# Patient Record
Sex: FEMALE | Race: WHITE | NOT HISPANIC OR LATINO | URBAN - METROPOLITAN AREA
[De-identification: names, ages, dates, MRNs, and addresses within clinical notes are randomized per-mention and may not be internally consistent; named-entity substitution may affect disease eponyms.]

---

## 2020-09-02 ENCOUNTER — EMERGENCY (EMERGENCY)
Facility: HOSPITAL | Age: 45
LOS: 1 days | Discharge: HOME | End: 2020-09-02
Admitting: EMERGENCY MEDICINE
Payer: COMMERCIAL

## 2020-09-02 VITALS
SYSTOLIC BLOOD PRESSURE: 108 MMHG | HEIGHT: 67 IN | DIASTOLIC BLOOD PRESSURE: 52 MMHG | WEIGHT: 145.06 LBS | OXYGEN SATURATION: 97 % | RESPIRATION RATE: 16 BRPM | TEMPERATURE: 100 F | HEART RATE: 70 BPM

## 2020-09-02 PROCEDURE — 99283 EMERGENCY DEPT VISIT LOW MDM: CPT

## 2020-09-02 NOTE — ED PROVIDER NOTE - NSFOLLOWUPINSTRUCTIONS_ED_ALL_ED_FT
Your covid-19 results are pending, you will receive them electronically.     Return to the Emergency Department for any concerns.

## 2020-09-02 NOTE — ED PROVIDER NOTE - PATIENT PORTAL LINK FT
You can access the FollowMyHealth Patient Portal offered by [facility] by registering at the following website: [email or web address]. By joining [employer]’s FollowMyHealth portal, you will also be able to view your health information using other applications (apps) compatible with our system.

## 2020-09-02 NOTE — ED PROVIDER NOTE - OBJECTIVE STATEMENT
46 yo F w/ no pertinent PMHx presents to the ED requesting COVID-19 testing. Pt has no medical complaints currently. Denies fever, chills, cough, SOB, chest pain, abdominal pain, N/V/D, throat pain. Pt denies contact with a person who has known COVID-19. 44 yo F w/ no pertinent PMHx presents to the ED requesting COVID-19 testing. Pt has no medical complaints currently. Denies fever, chills, cough, SOB, chest pain, abdominal pain, N/V/D, throat pain. Pt reports contact with a person who had URI symptoms recently.

## 2020-09-02 NOTE — ED PROVIDER NOTE - CLINICAL SUMMARY MEDICAL DECISION MAKING FREE TEXT BOX
Pt presents to the ED for COVID-19 testing. Pt denies medical complaints or symptoms. No vital sign derangements. Will swab for COVID-19 and discharge. Pt agrees to receiving results electronically. Pt presents to the ED for COVID-19 testing. Pt denies medical complaints or symptoms. No vital sign derangements. well appearing, NAD. Will swab for COVID-19 and discharge. Pt agrees to receiving results electronically.

## 2020-09-02 NOTE — ED ADULT NURSE NOTE - OBJECTIVE STATEMENT
Pt aox3. Here for Covid-19 testing.  Pt rpts contact with someone with flu-like sx.  Pt denies any sx.

## 2020-09-06 DIAGNOSIS — Z20.828 CONTACT WITH AND (SUSPECTED) EXPOSURE TO OTHER VIRAL COMMUNICABLE DISEASES: ICD-10-CM

## 2020-10-10 ENCOUNTER — EMERGENCY (EMERGENCY)
Facility: HOSPITAL | Age: 45
LOS: 1 days | Discharge: HOME | End: 2020-10-10
Admitting: EMERGENCY MEDICINE
Payer: COMMERCIAL

## 2020-10-10 VITALS
DIASTOLIC BLOOD PRESSURE: 71 MMHG | TEMPERATURE: 100 F | HEART RATE: 71 BPM | RESPIRATION RATE: 18 BRPM | OXYGEN SATURATION: 98 % | HEIGHT: 67 IN | SYSTOLIC BLOOD PRESSURE: 117 MMHG | WEIGHT: 139.99 LBS

## 2020-10-10 DIAGNOSIS — Z20.828 CONTACT WITH AND (SUSPECTED) EXPOSURE TO OTHER VIRAL COMMUNICABLE DISEASES: ICD-10-CM

## 2020-10-10 DIAGNOSIS — F41.9 ANXIETY DISORDER, UNSPECIFIED: ICD-10-CM

## 2020-10-10 PROCEDURE — 99283 EMERGENCY DEPT VISIT LOW MDM: CPT

## 2020-10-10 RX ORDER — ESCITALOPRAM OXALATE 10 MG/1
1 TABLET, FILM COATED ORAL
Qty: 30 | Refills: 0
Start: 2020-10-10 | End: 2020-11-08

## 2020-10-10 NOTE — ED PROVIDER NOTE - OBJECTIVE STATEMENT
46 y/o female presenting to the ED requesting COVID-19 screening. Patient is currently asymptomatic and has no other medical complaints at this time. Denies recent travel or known exposure to COVID-19. Denies fever, chills, chest pain, SOB. 46 y/o female presenting to the ED requesting COVID-19 screening. Patient is currently asymptomatic and has no other medical complaints at this time -- works as a . Denies recent travel or known exposure to COVID-19. Denies fever, chills, chest pain, SOB. also requesting med refill for lexapro as she was not able to get to [facility] today as she states its closed and will be in the process of getting new followup.

## 2020-10-10 NOTE — ED PROVIDER NOTE - PATIENT PORTAL LINK FT
You can access the FollowMyHealth Patient Portal offered by [facility] by registering at the following website: [email or web address]. By joining [employer]’s FollowMyHealth portal, you will also be able to view your health information using other applications (apps) compatible with our system.

## 2020-10-10 NOTE — ED PROVIDER NOTE - CLINICAL SUMMARY MEDICAL DECISION MAKING FREE TEXT BOX
Patient requesting testing for COVID-19. On exam, Patient appears well and in no apparent distress. Currently asymptomatic. Will order testing for COVID-19 and discharge afterwards. Patient agrees to receiving results electronically. Patient requesting testing for COVID-19. On exam, Patient appears well and in no apparent distress. Currently asymptomatic. Will order testing for COVID-19 and discharge afterwards. Patient agrees to receiving results electronically. also requesting med refill for lexapro, will rx meds, no acute psych issues, will dc.

## 2020-10-10 NOTE — ED PROVIDER NOTE - PHYSICAL EXAMINATION
Gen - WDWN, NAD, comfortable and non-toxic appearing  Skin - warm, dry, intact   HEENT - Airway patent, neck supple. Uvula midline. No oropharyngeal swelling/edema.  CV - S1S2, R/R/R  Resp - Breathing comfortably on RA. Lungs CTA B/L.  GI - Soft, NT/ND  MS - Neck supple, no extremity swelling/edema  Neuro - AxOx3, clear speech, grossly unremarkable.  Psych - Calm and cooperative. Normal mood and affect. Gen - WDWN, NAD, comfortable and non-toxic appearing  Skin - warm, dry, intact   HEENT - Airway patent  Resp - Breathing comfortably on RA.   Psych - Calm and cooperative. Normal mood and affect. denies SI/HI.

## 2020-10-11 LAB — SARS-COV-2 RNA SPEC QL NAA+PROBE: SIGNIFICANT CHANGE UP

## 2020-11-13 ENCOUNTER — EMERGENCY (EMERGENCY)
Facility: HOSPITAL | Age: 45
LOS: 1 days | Discharge: HOME | End: 2020-11-13
Admitting: STUDENT IN AN ORGANIZED HEALTH CARE EDUCATION/TRAINING PROGRAM
Payer: COMMERCIAL

## 2020-11-13 VITALS
SYSTOLIC BLOOD PRESSURE: 124 MMHG | DIASTOLIC BLOOD PRESSURE: 78 MMHG | HEART RATE: 80 BPM | RESPIRATION RATE: 16 BRPM | HEIGHT: 67 IN | TEMPERATURE: 98 F | OXYGEN SATURATION: 99 %

## 2020-11-13 DIAGNOSIS — Z20.828 CONTACT WITH AND (SUSPECTED) EXPOSURE TO OTHER VIRAL COMMUNICABLE DISEASES: ICD-10-CM

## 2020-11-13 PROCEDURE — 99283 EMERGENCY DEPT VISIT LOW MDM: CPT

## 2020-11-13 NOTE — ED PROVIDER NOTE - NSFOLLOWUPINSTRUCTIONS_ED_ALL_ED_FT
THE COVID 19 TEST RESULTS  - results may take up to 2-3 days to become available   - if you have consented, you will receive your results electronically   -  you can check [facility] or call [phone] to discuss your results with our nursing staff    Please continue to self quarantine (home isolation) until your result is back and follow instructions accordingly  - positive: complete home isolation for a total of 14 days since day of testing and no more fever with feeling back to baseline   - negative: you will be able to stop home isolation but still practice standard precautions, similar to how you would manage a regular flu/cold.    Return to ER for any worsening symptoms, such as persistent fever >100.4F, shortness of breath, coughing up bloody sputum, chest pain, lethargy, and fainting    Please remember to wash your hands frequently (>20 seconds each time), avoid touching your face, and cover your cough/sneeze.  Always wear a mask when you are outside of your home and practice social distancing.    Only take Tylenol for fever/pain control and avoid NSAIDs (ibuprofen/Advil/Aleve/naproxen) due to potential increased risk of exacerbating COVID-19 infection

## 2020-11-13 NOTE — ED PROVIDER NOTE - OBJECTIVE STATEMENT
44 y/o F presents to ED 46 y/o F presents to ED requesting COVID19 testing.  Pt is asymptomatic and denies recent contact with confirmed or suspected person of COVID19 intact.  Pt denies fevers/chills, cough, chest pain, abd pain, n/v/d, known sick contacts or recent travels.

## 2020-11-13 NOTE — ED PROVIDER NOTE - PATIENT PORTAL LINK FT
You can access the FollowMyHealth Patient Portal offered by [facility] by registering at the following website: [email or web address]. By joining [employer]’s FollowMyHealth portal, you will also be able to view your health information using other applications (apps) compatible with our system.

## 2020-11-14 LAB — SARS-COV-2 RNA SPEC QL NAA+PROBE: SIGNIFICANT CHANGE UP

## 2020-11-22 ENCOUNTER — EMERGENCY (EMERGENCY)
Facility: HOSPITAL | Age: 45
LOS: 1 days | Discharge: HOME | End: 2020-11-22
Admitting: EMERGENCY MEDICINE
Payer: COMMERCIAL

## 2020-11-22 VITALS
TEMPERATURE: 98 F | SYSTOLIC BLOOD PRESSURE: 130 MMHG | DIASTOLIC BLOOD PRESSURE: 80 MMHG | HEIGHT: 67 IN | HEART RATE: 65 BPM | RESPIRATION RATE: 18 BRPM | OXYGEN SATURATION: 98 %

## 2020-11-22 DIAGNOSIS — Z20.828 CONTACT WITH AND (SUSPECTED) EXPOSURE TO OTHER VIRAL COMMUNICABLE DISEASES: ICD-10-CM

## 2020-11-22 PROCEDURE — 99283 EMERGENCY DEPT VISIT LOW MDM: CPT

## 2020-11-22 NOTE — ED PROVIDER NOTE - PATIENT PORTAL LINK FT
You can access the FollowMyHealth Patient Portal offered by [facility] by registering at the following website: [email or web address]. By joining [employer]’s FollowMyHealth portal, you will also be able to view your health information using other applications (apps) compatible with our system.

## 2020-12-08 ENCOUNTER — EMERGENCY (EMERGENCY)
Facility: HOSPITAL | Age: 45
LOS: 1 days | Discharge: HOME | End: 2020-12-08
Admitting: EMERGENCY MEDICINE
Payer: COMMERCIAL

## 2020-12-08 VITALS
SYSTOLIC BLOOD PRESSURE: 125 MMHG | TEMPERATURE: 99 F | OXYGEN SATURATION: 98 % | WEIGHT: 134.92 LBS | DIASTOLIC BLOOD PRESSURE: 68 MMHG | HEIGHT: 67 IN | RESPIRATION RATE: 18 BRPM | HEART RATE: 72 BPM

## 2020-12-08 DIAGNOSIS — Z20.828 CONTACT WITH AND (SUSPECTED) EXPOSURE TO OTHER VIRAL COMMUNICABLE DISEASES: ICD-10-CM

## 2020-12-08 PROCEDURE — 99283 EMERGENCY DEPT VISIT LOW MDM: CPT

## 2020-12-08 NOTE — ED PROVIDER NOTE - PATIENT PORTAL LINK FT
You can access the FollowMyHealth Patient Portal offered by [facility] by registering at the following website: [email or web address]. By joining [employer]’s FollowMyHealth portal, you will also be able to view your health information using other applications (apps) compatible with our system.

## 2021-04-27 ENCOUNTER — EMERGENCY (EMERGENCY)
Facility: HOSPITAL | Age: 46
LOS: 1 days | Discharge: HOME | End: 2021-04-27
Admitting: EMERGENCY MEDICINE
Payer: COMMERCIAL

## 2021-04-27 VITALS
OXYGEN SATURATION: 98 % | SYSTOLIC BLOOD PRESSURE: 121 MMHG | HEIGHT: 67 IN | HEART RATE: 64 BPM | DIASTOLIC BLOOD PRESSURE: 63 MMHG | WEIGHT: 156.97 LBS | RESPIRATION RATE: 18 BRPM | TEMPERATURE: 98 F

## 2021-04-27 VITALS
SYSTOLIC BLOOD PRESSURE: 116 MMHG | RESPIRATION RATE: 18 BRPM | HEART RATE: 58 BPM | OXYGEN SATURATION: 99 % | TEMPERATURE: 98 F | DIASTOLIC BLOOD PRESSURE: 76 MMHG

## 2021-04-27 DIAGNOSIS — R42 DIZZINESS AND GIDDINESS: ICD-10-CM

## 2021-04-27 DIAGNOSIS — R00.1 BRADYCARDIA, UNSPECIFIED: ICD-10-CM

## 2021-04-27 DIAGNOSIS — Z79.899 OTHER LONG TERM (CURRENT) DRUG THERAPY: ICD-10-CM

## 2021-04-27 DIAGNOSIS — Z79.2 LONG TERM (CURRENT) USE OF ANTIBIOTICS: ICD-10-CM

## 2021-04-27 DIAGNOSIS — Z20.822 CONTACT WITH AND (SUSPECTED) EXPOSURE TO COVID-19: ICD-10-CM

## 2021-04-27 LAB
ALBUMIN SERPL ELPH-MCNC: 4 G/DL — SIGNIFICANT CHANGE UP (ref 3.4–5)
ALP SERPL-CCNC: 46 U/L — SIGNIFICANT CHANGE UP (ref 40–120)
ALT FLD-CCNC: 24 U/L — SIGNIFICANT CHANGE UP (ref 12–42)
AMPHET UR-MCNC: NEGATIVE — SIGNIFICANT CHANGE UP
ANION GAP SERPL CALC-SCNC: 9 MMOL/L — SIGNIFICANT CHANGE UP (ref 9–16)
APPEARANCE UR: CLEAR — SIGNIFICANT CHANGE UP
APTT BLD: 30.1 SEC — SIGNIFICANT CHANGE UP (ref 27.5–35.5)
AST SERPL-CCNC: 27 U/L — SIGNIFICANT CHANGE UP (ref 15–37)
BACTERIA # UR AUTO: PRESENT /HPF
BARBITURATES UR SCN-MCNC: NEGATIVE — SIGNIFICANT CHANGE UP
BASOPHILS # BLD AUTO: 0.04 K/UL — SIGNIFICANT CHANGE UP (ref 0–0.2)
BASOPHILS NFR BLD AUTO: 0.7 % — SIGNIFICANT CHANGE UP (ref 0–2)
BENZODIAZ UR-MCNC: NEGATIVE — SIGNIFICANT CHANGE UP
BILIRUB SERPL-MCNC: 0.4 MG/DL — SIGNIFICANT CHANGE UP (ref 0.2–1.2)
BILIRUB UR-MCNC: NEGATIVE — SIGNIFICANT CHANGE UP
BUN SERPL-MCNC: 10 MG/DL — SIGNIFICANT CHANGE UP (ref 7–23)
CALCIUM SERPL-MCNC: 9.6 MG/DL — SIGNIFICANT CHANGE UP (ref 8.5–10.5)
CHLORIDE SERPL-SCNC: 104 MMOL/L — SIGNIFICANT CHANGE UP (ref 96–108)
CO2 SERPL-SCNC: 28 MMOL/L — SIGNIFICANT CHANGE UP (ref 22–31)
COCAINE METAB.OTHER UR-MCNC: NEGATIVE — SIGNIFICANT CHANGE UP
COLOR SPEC: YELLOW — SIGNIFICANT CHANGE UP
CREAT SERPL-MCNC: 0.63 MG/DL — SIGNIFICANT CHANGE UP (ref 0.5–1.3)
DIFF PNL FLD: ABNORMAL
EOSINOPHIL # BLD AUTO: 0.03 K/UL — SIGNIFICANT CHANGE UP (ref 0–0.5)
EOSINOPHIL NFR BLD AUTO: 0.5 % — SIGNIFICANT CHANGE UP (ref 0–6)
EPI CELLS # UR: SIGNIFICANT CHANGE UP /HPF (ref 0–5)
ETHANOL SERPL-MCNC: <3 MG/DL — SIGNIFICANT CHANGE UP
GLUCOSE SERPL-MCNC: 99 MG/DL — SIGNIFICANT CHANGE UP (ref 70–99)
GLUCOSE UR QL: NEGATIVE — SIGNIFICANT CHANGE UP
HCT VFR BLD CALC: 38.9 % — SIGNIFICANT CHANGE UP (ref 34.5–45)
HGB BLD-MCNC: 13.2 G/DL — SIGNIFICANT CHANGE UP (ref 11.5–15.5)
IMM GRANULOCYTES NFR BLD AUTO: 0.3 % — SIGNIFICANT CHANGE UP (ref 0–1.5)
INR BLD: 1.12 — SIGNIFICANT CHANGE UP (ref 0.88–1.16)
KETONES UR-MCNC: NEGATIVE — SIGNIFICANT CHANGE UP
LACTATE SERPL-SCNC: 0.5 MMOL/L — SIGNIFICANT CHANGE UP (ref 0.4–2)
LEUKOCYTE ESTERASE UR-ACNC: ABNORMAL
LIDOCAIN IGE QN: 154 U/L — SIGNIFICANT CHANGE UP (ref 73–393)
LYMPHOCYTES # BLD AUTO: 1.83 K/UL — SIGNIFICANT CHANGE UP (ref 1–3.3)
LYMPHOCYTES # BLD AUTO: 30.1 % — SIGNIFICANT CHANGE UP (ref 13–44)
MAGNESIUM SERPL-MCNC: 2 MG/DL — SIGNIFICANT CHANGE UP (ref 1.6–2.6)
MCHC RBC-ENTMCNC: 30.1 PG — SIGNIFICANT CHANGE UP (ref 27–34)
MCHC RBC-ENTMCNC: 33.9 GM/DL — SIGNIFICANT CHANGE UP (ref 32–36)
MCV RBC AUTO: 88.8 FL — SIGNIFICANT CHANGE UP (ref 80–100)
METHADONE UR-MCNC: NEGATIVE — SIGNIFICANT CHANGE UP
MONOCYTES # BLD AUTO: 0.32 K/UL — SIGNIFICANT CHANGE UP (ref 0–0.9)
MONOCYTES NFR BLD AUTO: 5.3 % — SIGNIFICANT CHANGE UP (ref 2–14)
NEUTROPHILS # BLD AUTO: 3.83 K/UL — SIGNIFICANT CHANGE UP (ref 1.8–7.4)
NEUTROPHILS NFR BLD AUTO: 63.1 % — SIGNIFICANT CHANGE UP (ref 43–77)
NITRITE UR-MCNC: NEGATIVE — SIGNIFICANT CHANGE UP
NRBC # BLD: 0 /100 WBCS — SIGNIFICANT CHANGE UP (ref 0–0)
NT-PROBNP SERPL-SCNC: 131 PG/ML — SIGNIFICANT CHANGE UP
OPIATES UR-MCNC: NEGATIVE — SIGNIFICANT CHANGE UP
PCP SPEC-MCNC: SIGNIFICANT CHANGE UP
PCP UR-MCNC: NEGATIVE — SIGNIFICANT CHANGE UP
PH UR: 7 — SIGNIFICANT CHANGE UP (ref 5–8)
PLATELET # BLD AUTO: 286 K/UL — SIGNIFICANT CHANGE UP (ref 150–400)
POTASSIUM SERPL-MCNC: 4.1 MMOL/L — SIGNIFICANT CHANGE UP (ref 3.5–5.3)
POTASSIUM SERPL-SCNC: 4.1 MMOL/L — SIGNIFICANT CHANGE UP (ref 3.5–5.3)
PROT SERPL-MCNC: 7.4 G/DL — SIGNIFICANT CHANGE UP (ref 6.4–8.2)
PROT UR-MCNC: NEGATIVE MG/DL — SIGNIFICANT CHANGE UP
PROTHROM AB SERPL-ACNC: 13.2 SEC — SIGNIFICANT CHANGE UP (ref 10.6–13.6)
RBC # BLD: 4.38 M/UL — SIGNIFICANT CHANGE UP (ref 3.8–5.2)
RBC # FLD: 12.1 % — SIGNIFICANT CHANGE UP (ref 10.3–14.5)
RBC CASTS # UR COMP ASSIST: ABNORMAL /HPF
SARS-COV-2 RNA SPEC QL NAA+PROBE: SIGNIFICANT CHANGE UP
SODIUM SERPL-SCNC: 141 MMOL/L — SIGNIFICANT CHANGE UP (ref 132–145)
SP GR SPEC: 1.01 — SIGNIFICANT CHANGE UP (ref 1–1.03)
THC UR QL: NEGATIVE — SIGNIFICANT CHANGE UP
TROPONIN I SERPL-MCNC: <0.017 NG/ML — LOW (ref 0.02–0.06)
UROBILINOGEN FLD QL: 0.2 E.U./DL — SIGNIFICANT CHANGE UP
WBC # BLD: 6.07 K/UL — SIGNIFICANT CHANGE UP (ref 3.8–10.5)
WBC # FLD AUTO: 6.07 K/UL — SIGNIFICANT CHANGE UP (ref 3.8–10.5)
WBC UR QL: ABNORMAL /HPF

## 2021-04-27 PROCEDURE — 99285 EMERGENCY DEPT VISIT HI MDM: CPT

## 2021-04-27 PROCEDURE — 70450 CT HEAD/BRAIN W/O DYE: CPT | Mod: 26

## 2021-04-27 PROCEDURE — 93010 ELECTROCARDIOGRAM REPORT: CPT | Mod: NC

## 2021-04-27 RX ORDER — ACETAMINOPHEN 500 MG
650 TABLET ORAL ONCE
Refills: 0 | Status: COMPLETED | OUTPATIENT
Start: 2021-04-27 | End: 2021-04-27

## 2021-04-27 RX ORDER — IBUPROFEN 200 MG
600 TABLET ORAL ONCE
Refills: 0 | Status: COMPLETED | OUTPATIENT
Start: 2021-04-27 | End: 2021-04-27

## 2021-04-27 RX ORDER — METOCLOPRAMIDE HCL 10 MG
10 TABLET ORAL ONCE
Refills: 0 | Status: COMPLETED | OUTPATIENT
Start: 2021-04-27 | End: 2021-04-27

## 2021-04-27 RX ORDER — SODIUM CHLORIDE 9 MG/ML
1000 INJECTION INTRAMUSCULAR; INTRAVENOUS; SUBCUTANEOUS ONCE
Refills: 0 | Status: COMPLETED | OUTPATIENT
Start: 2021-04-27 | End: 2021-04-27

## 2021-04-27 RX ORDER — MECLIZINE HCL 12.5 MG
25 TABLET ORAL ONCE
Refills: 0 | Status: COMPLETED | OUTPATIENT
Start: 2021-04-27 | End: 2021-04-27

## 2021-04-27 RX ORDER — DIPHENHYDRAMINE HCL 50 MG
25 CAPSULE ORAL ONCE
Refills: 0 | Status: COMPLETED | OUTPATIENT
Start: 2021-04-27 | End: 2021-04-27

## 2021-04-27 RX ADMIN — Medication 1 TABLET(S): at 12:23

## 2021-04-27 RX ADMIN — Medication 600 MILLIGRAM(S): at 12:23

## 2021-04-27 RX ADMIN — Medication 650 MILLIGRAM(S): at 12:22

## 2021-04-27 RX ADMIN — SODIUM CHLORIDE 1000 MILLILITER(S): 9 INJECTION INTRAMUSCULAR; INTRAVENOUS; SUBCUTANEOUS at 12:22

## 2021-04-27 RX ADMIN — Medication 25 MILLIGRAM(S): at 12:23

## 2021-04-27 RX ADMIN — Medication 25 MILLIGRAM(S): at 12:24

## 2021-04-27 RX ADMIN — Medication 10 MILLIGRAM(S): at 12:24

## 2021-04-27 NOTE — ED ADULT TRIAGE NOTE - CHIEF COMPLAINT QUOTE
Patient to ED with complaint of vertigo last night with episodes of vomiting.  Patient states this is second time in 1 week.  Patient denies CP and SOB

## 2021-04-27 NOTE — ED PROVIDER NOTE - OBJECTIVE STATEMENT
45 y/o female who is a recovering addict presents to the ED with complaints of intermittent positional dizziness described as a room-spinning sensation. Patient states she had a similar episode before, and she called her a Teledoc who had her do a maneuver, and her symptoms went away. Patient feels more discomfort when she moves. Endorses some nausea. Denies fever, chills, chest pain, SOB, abdominal pain, N/V, syncope, or weakness. Walks without assistance. 47 y/o female who is a recovering addict presents to the ED with complaints of intermittent positional dizziness described as a room-spinning sensation. Patient states she had a similar episode before, and she called a Teledoc who had her do a maneuver, and her symptoms went away. Patient feels more discomfort when she moves. Endorses some nausea. Denies fever, chills, chest pain, SOB, abdominal pain, vomiting, syncope, or weakness. Walks without assistance.

## 2021-04-27 NOTE — ED PROVIDER NOTE - CLINICAL SUMMARY MEDICAL DECISION MAKING FREE TEXT BOX
Patient with room-spinning dizziness. Will order CT head and do vertigo workup. Will provide symptomatic relief.

## 2021-04-27 NOTE — ED PROVIDER NOTE - PATIENT PORTAL LINK FT
You can access the FollowMyHealth Patient Portal offered by [facility] by registering at the following website: [email or web address]. By joining [employer]’s FollowMyHealth portal, you will also be able to view your health information using other applications (apps) compatible with our system.

## 2021-04-29 ENCOUNTER — NON-APPOINTMENT (OUTPATIENT)
Age: 46
End: 2021-04-29

## 2021-04-29 PROBLEM — Z00.00 ENCOUNTER FOR PREVENTIVE HEALTH EXAMINATION: Status: ACTIVE | Noted: 2021-04-29

## 2021-04-30 ENCOUNTER — APPOINTMENT (OUTPATIENT)
Dept: OTOLARYNGOLOGY | Facility: CLINIC | Age: 46
End: 2021-04-30
Payer: COMMERCIAL

## 2021-04-30 VITALS — WEIGHT: 157 LBS | TEMPERATURE: 97.5 F | HEIGHT: 67 IN | BODY MASS INDEX: 24.64 KG/M2

## 2021-04-30 DIAGNOSIS — Z82.49 FAMILY HISTORY OF ISCHEMIC HEART DISEASE AND OTHER DISEASES OF THE CIRCULATORY SYSTEM: ICD-10-CM

## 2021-04-30 DIAGNOSIS — H93.293 OTHER ABNORMAL AUDITORY PERCEPTIONS, BILATERAL: ICD-10-CM

## 2021-04-30 DIAGNOSIS — Z86.39 PERSONAL HISTORY OF OTHER ENDOCRINE, NUTRITIONAL AND METABOLIC DISEASE: ICD-10-CM

## 2021-04-30 DIAGNOSIS — Z87.891 PERSONAL HISTORY OF NICOTINE DEPENDENCE: ICD-10-CM

## 2021-04-30 DIAGNOSIS — N95.9 UNSPECIFIED MENOPAUSAL AND PERIMENOPAUSAL DISORDER: ICD-10-CM

## 2021-04-30 DIAGNOSIS — Z83.3 FAMILY HISTORY OF DIABETES MELLITUS: ICD-10-CM

## 2021-04-30 DIAGNOSIS — Z86.59 PERSONAL HISTORY OF OTHER MENTAL AND BEHAVIORAL DISORDERS: ICD-10-CM

## 2021-04-30 PROCEDURE — 99203 OFFICE O/P NEW LOW 30 MIN: CPT

## 2021-04-30 PROCEDURE — 99072 ADDL SUPL MATRL&STAF TM PHE: CPT

## 2021-04-30 PROCEDURE — 92567 TYMPANOMETRY: CPT

## 2021-04-30 PROCEDURE — 92557 COMPREHENSIVE HEARING TEST: CPT

## 2021-04-30 NOTE — CONSULT LETTER
[Dear  ___] : Dear  [unfilled], [Consult Letter:] : I had the pleasure of evaluating your patient, [unfilled]. [Please see my note below.] : Please see my note below. [Consult Closing:] : Thank you very much for allowing me to participate in the care of this patient.  If you have any questions, please do not hesitate to contact me. [Sincerely,] : Sincerely, [FreeTextEntry3] : Lilliam Schmid MD\par

## 2021-04-30 NOTE — ASSESSMENT
[FreeTextEntry1] : She has had episodes of dizziness and vertigo consistent with benign positional vertigo. Her ear exam and audiogram were normal. there was no obvious ear infection on exam today.\par \par PLAN\par \par -findings and management options discussed in detail with the patient. \par -good aural hygiene\par -avoid using cotton swabs in the ears\par -noise precautions\par -annual audiogram\par -meclizine prn severe dizziness\par -she may stop the antibiotics as the ear looked normal\par - I am sending her for vestibular therapy. She held off on Lia-Hallpike testing today as she gets severely nauseated with the maneuver.\par -follow up after the vestibular therapy evaluation. I asked to call me and let me know if the dizziness resolved. If it does, I will see her back as needed. If it does not, we will discuss further workup such as imaging studies, specialized inner ear testing, and neurology evaluation\par -call and return earlier if any concerns. \par

## 2021-04-30 NOTE — HISTORY OF PRESENT ILLNESS
[de-identified] : SHU NORMAN is a 46 year patient Here for evaluation for dizziness and vertigo. She had an episode of vertigo about 6 weeks ago. About 5 days after she got her second COVID vaccine, she developed vertigo when she turned over in bed to the right at night. It lasted several seconds or minutes. She did have nausea. She had no other ear symptoms. She felt a bit off the next day. She spoke with her PCP and did the Epley maneuver which helped. She did well until this past Monday night. Again she turned the right and had severe vertigo with nausea. She had no otalgia, otorrhea, or tinnitus. She did not notice a change in her hearing. She had no muscle weakness or visual changes. She went to the emergency room. She said a CT scan and EKG were okay. She was placed on antibiotics for an ear infection. She had recurrent ear infections as a child. She has no history of prior otologic surgery. She has no history of ear or head trauma. She did have an ear plug removed from the ear in 2011. She does have a history of noise exposure in the past. 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 She has not had a recent audiogram. She does have a history of migraines in the past but has not had one for several years. She does have TMJ dysfunction. She is undergoing Invisalign treatment. She is also perimenopausal. She has been taking meclizine as needed

## 2021-05-12 ENCOUNTER — NON-APPOINTMENT (OUTPATIENT)
Age: 46
End: 2021-05-12

## 2021-05-20 ENCOUNTER — NON-APPOINTMENT (OUTPATIENT)
Age: 46
End: 2021-05-20

## 2022-02-14 NOTE — ED ADULT TRIAGE NOTE - SPO2 (%)
Rapid Mohs Report (Note: If The Tumor Is Complex, Or If Any Stage Is Divided Into More Than 5 Pieces Or If You Want A More Detailed Report, Select No And Proceed To The Individual Stages Below): yes 98

## 2023-02-27 NOTE — ED PROVIDER NOTE - DOMESTIC TRAVEL HIGH RISK QUESTION
"Subjective   Neil Thapa is a 59 y.o. male presents for   Chief Complaint   Patient presents with   • Hypertension     Noticed some bloody noses.       Health Maintenance Due   Topic Date Due   • TDAP/TD VACCINES (1 - Tdap) Never done   • ZOSTER VACCINE (1 of 2) Never done   • HEPATITIS C SCREENING  Never done   • COVID-19 Vaccine (2 - Booster for Sarah series) 05/08/2021       History of Present Illness   Pt present with concerns for elevated blood pressure and heart rate.  He states the past 2 weeks he has woken with a headache and has taken tylenol.  He also reports chest pain.  He states chest pain comes and goes and states every once in a while he will feel shortness of breath.    He states 2 weeks ago he experienced a bloody nose and BP was 179/97.  bp today at work was 144/82 today.  He states pulse was 127. He has been experiencing bloody noses more often and had one this morning.   Vitals:    02/27/23 1524   BP: 134/88   BP Location: Right arm   Patient Position: Sitting   Cuff Size: Adult   Pulse: 101   Temp: 97.5 °F (36.4 °C)   TempSrc: Temporal   SpO2: 93%   Weight: 99.1 kg (218 lb 6.4 oz)   Height: 177.8 cm (70\")     Body mass index is 31.34 kg/m².    Current Outpatient Medications on File Prior to Visit   Medication Sig Dispense Refill   • citalopram (CeleXA) 20 MG tablet TAKE ONE TABLET BY MOUTH DAILY 90 tablet 3   • [DISCONTINUED] azithromycin (Zithromax Z-Vernon) 250 MG tablet Take 2 tablets by mouth on day 1, then 1 tablet daily on days 2-5 6 tablet 0   • [DISCONTINUED] benzonatate (Tessalon Perles) 100 MG capsule Take 1 capsule by mouth 3 (Three) Times a Day As Needed for Cough. 30 capsule 1   • [DISCONTINUED] methocarbamol (Robaxin) 500 MG tablet Take 1 tablet by mouth 3 (Three) Times a Day. 15 tablet 0     No current facility-administered medications on file prior to visit.       The following portions of the patient's history were reviewed and updated as appropriate: allergies, current " medications, past family history, past medical history, past social history, past surgical history, and problem list.    Review of Systems   Constitutional: Negative for chills and fever.   HENT: Positive for nosebleeds. Negative for sinus pressure and sore throat.    Eyes: Negative for blurred vision.   Respiratory: Positive for chest tightness and shortness of breath (intermittently). Negative for cough.    Cardiovascular: Negative for chest pain.   Gastrointestinal: Negative for abdominal pain.   Endocrine: Negative.    Genitourinary: Negative.    Musculoskeletal: Negative for arthralgias and joint swelling.   Skin: Negative for color change.   Neurological: Negative for dizziness.   Psychiatric/Behavioral: Negative for behavioral problems.       Objective   Physical Exam  Vitals and nursing note reviewed.   Constitutional:       Appearance: Normal appearance. He is well-developed.   HENT:      Head: Normocephalic and atraumatic.      Right Ear: Tympanic membrane, ear canal and external ear normal.      Left Ear: Tympanic membrane, ear canal and external ear normal.      Nose: Nose normal.   Eyes:      Extraocular Movements: Extraocular movements intact.      Conjunctiva/sclera: Conjunctivae normal.      Pupils: Pupils are equal, round, and reactive to light.   Cardiovascular:      Rate and Rhythm: Regular rhythm. Tachycardia present.      Heart sounds: Normal heart sounds.   Pulmonary:      Effort: Pulmonary effort is normal.      Breath sounds: Normal breath sounds.   Abdominal:      General: Bowel sounds are normal.      Palpations: Abdomen is soft.   Musculoskeletal:         General: Normal range of motion.      Cervical back: Normal range of motion and neck supple.   Skin:     General: Skin is warm and dry.   Neurological:      General: No focal deficit present.      Mental Status: He is alert and oriented to person, place, and time.   Psychiatric:         Mood and Affect: Mood normal.         Behavior:  Behavior normal.       PHQ-9 Total Score:      Assessment & Plan   Diagnoses and all orders for this visit:    1. Tachycardia (Primary)  Comments:  Educated to take metoprolol in the morning and on the side effects.  Orders:  -     metoprolol succinate XL (Toprol XL) 25 MG 24 hr tablet; Take 1 tablet by mouth Daily.  Dispense: 30 tablet; Refill: 6  -     ECG 12 Lead    2. Primary hypertension  Comments:  Instructed to monitor BP and heart rate 2-3 times per week.   Orders:  -     metoprolol succinate XL (Toprol XL) 25 MG 24 hr tablet; Take 1 tablet by mouth Daily.  Dispense: 30 tablet; Refill: 6    3. Chest pain, unspecified type  Comments:  Likely secondary to tachycardia. Patient instructed to call for ongoing symptoms and refer to cardiology if no improvement.     4. Epistaxis  Comments:  Instructed on use of saline spray and vaseline for moisture. Call if no improvement.         There are no Patient Instructions on file for this visit.        No

## 2023-03-21 NOTE — ED ADULT NURSE NOTE - NS ED NURSE LEVEL OF CONSCIOUSNESS SPEECH
Albendazole Counseling:  I discussed with the patient the risks of albendazole including but not limited to cytopenia, kidney damage, nausea/vomiting and severe allergy.  The patient understands that this medication is being used in an off-label manner. Speaking Coherently

## 2023-04-18 NOTE — ED ADULT TRIAGE NOTE - NS ED NURSE BANDS TYPE
PSYCHIATRY FOLLOW UP    Patient: Manuel Rodríguez  Date: 2023    :     1995       SOURCE OF INFORMATION  I based this report upon my review of information from written and electronic medical records and upon my interview and assessment of the patient's condition.    CHIEF COMPLAINT  Follow up for insomnia and ADHD    HISTORY OF PRESENTING ILLNESS  Taking medications as directed. No side effects.    No depression. No excessive anxiety.    Insomnia: no difficulty with sleep. No longer needs to take Trazodone.    ADHD: unchanged since last visit. Patient reports that the following symptoms have persisted for at least 6 months to a degree that is inconsistent with developmental level and that negatively impacts directly on social and academic/occupational activities:   - Often fails to give close attention to details or makes careless mistakes in schoolwork, at work, or during other activities  - Often has difficulty sustaining attention in tasks or play activities  - Often does not seem to listen when spoken to directly  - Often does not follow through on instructions and fails to finish schoolwork, chores, or duties in the workplace  - Often has difficulty organizing tasks and activities  - Often avoids, dislikes, or is reluctant to engage in tasks that require sustained mental effort  - Often loses things necessary for tasks and activities  - Is often easily distracted by extraneous stimuli or unrelated thoughts  - Is often forgetful in daily activities  - Often fidgets with or taps hands or feet or squirms in seat  - Often leaves seat in situations when remaining seated is expected  - Often feels restless  - Is often \"on the go,\" acting as if \"driven by a motor\"  - Often talks excessively  - Often has difficulty waiting turn  The symptoms are not solely a manifestation of oppositional behavior, defiance, hostility, or a failure to understand tasks or instructions. Several of the above symptoms are present  in two or more settings (e.g., at home, school, or work; with friends or relatives; in other activities). There is clear evidence that the symptoms interfere with, or reduce the quality of, social, academic, or occupational functioning. The symptoms are not better explained by another mental disorder.      PAST PSYCHIATRIC HISTORY  Prior diagnoses:     Suicide attempts: none    Inpatient: none    Outpatient: saw therapist for \"a few months,\" last visit September 2020      ALLERGIES  Fish  Tree nuts - anaphylaxis    PAST MEDICAL HISTORY  None    PAST SURGICAL HISTORY  [place] teeth removed    FAMILY PSYCHIATRIC HISTORY  None    SOCIAL HISTORY  Living with: lives with girlfriend. No children, never     Work:     Caffeine: has 1-2 cups of coffee a day    Tobacco: none    Alcohol: has 3-4 drinks 1-2 times a month    Illicit substances: none      ROS      MENTAL STATUS EXAM  Appearance: Good hygiene, appropriately dressed, no acute distress  Be[place]or: Cooperative with interview  Level of consciousness: Alert and oriented  Speech: Normal rate, rhythm, volume, and tone  Language: Fluent in English  Attention/concentration: Grossly unimpaired  Psychomotor: Normal  Fund of knowledge: Appropriate for age and education level  Memory: No impairment in short term or long term memory  Mood: \"good\"  Affect: Congruent with stated mood  Associations: Intact  Thought process: Linear, intact  Thought content: No SI/HI, no paranoia or delusions  Perceptual disorders/hallucinations: No AH/VH  Insight: Good  Judgement: Good      ASSESSMENT/PLAN  1. ADHD - continue Adderall 20 mg BID.    This encounter was done through a video visit due to the COVID-19 emergency. The patient gave verbal consent to do a video visit.  Patient location at time of visit: patient's home  Clinician location at time of visit: clinician's home  Length of visit: 15 minutes  E&M code 59761 for this visit is based on medical decision  making.    Discussed risks, benefits, potential side effects, and alteratives to treatment for all medication changes. Obtained informed consent for all medication changes. Instructed patient that if they have any questions to call provider's office during regular work hours. Instructed patient that if they experience any significant worsening of symptoms, severe side effects to medication, or intent or plan to harm themselves or others to call 911 or visit emergency department.  Return for follow up appointment in: 8 weeks    Jordy Chávez MD    Chief Complaint   Patient presents with   • Medication Management   • Video Visit     Current Outpatient Medications   Medication Sig   • amphetamine-dextroamphetamine (Adderall) 20 MG tablet Take 1 tablet by mouth 2 times daily.   • trazodone (DESYREL) 150 MG tablet TAKE 1 TABLET BY MOUTH EVERY DAY AT BEDTIME AS NEEDED FOR SLEEP   • cetirizine (ZyrTEC) 10 MG tablet Take 10 mg by mouth.     No current facility-administered medications for this visit.     ALLERGIES:   Allergen Reactions   • Tree Nuts    (Food) ANAPHYLAXIS, HIVES and SHORTNESS OF BREATH   • Cat Dander Other (See Comments)   • Fish-Derived Products   (Food Or Med) SWELLING     Past Medical History:   Diagnosis Date   • [place] teeth removed      No past surgical history on file.   No family history on file.   Social History     Socioeconomic History   • Marital status: Single     Spouse name: Not on file   • Number of children: Not on file   • Years of education: Not on file   • Highest education level: Not on file   Occupational History   • Not on file   Tobacco Use   • Smoking status: Not on file   • Smokeless tobacco: Not on file   Substance and Sexual Activity   • Alcohol use: Not on file   • Drug use: Not on file   • Sexual activity: Not on file   Other Topics Concern   • Not on file   Social History Narrative   • Not on file     Social Determinants of Health     Financial Resource Strain: Not on file    Food Insecurity: Not on file   Transportation Needs: Not on file   Physical Activity: Not on file   Stress: Not on file   Social Connections: Not on file   Intimate Partner Violence: Not on file     The encounter diagnosis was ADHD (attention deficit hyperactivity disorder), combined type.   Name band;

## 2024-04-11 ENCOUNTER — EMERGENCY (EMERGENCY)
Facility: HOSPITAL | Age: 49
LOS: 1 days | Discharge: HOME | End: 2024-04-11
Attending: STUDENT IN AN ORGANIZED HEALTH CARE EDUCATION/TRAINING PROGRAM | Admitting: STUDENT IN AN ORGANIZED HEALTH CARE EDUCATION/TRAINING PROGRAM
Payer: COMMERCIAL

## 2024-04-11 VITALS
SYSTOLIC BLOOD PRESSURE: 137 MMHG | HEART RATE: 51 BPM | DIASTOLIC BLOOD PRESSURE: 84 MMHG | OXYGEN SATURATION: 96 % | TEMPERATURE: 98 F | RESPIRATION RATE: 16 BRPM

## 2024-04-11 VITALS
SYSTOLIC BLOOD PRESSURE: 128 MMHG | WEIGHT: 160.06 LBS | HEIGHT: 67 IN | DIASTOLIC BLOOD PRESSURE: 80 MMHG | OXYGEN SATURATION: 98 % | HEART RATE: 60 BPM | RESPIRATION RATE: 17 BRPM | TEMPERATURE: 99 F

## 2024-04-11 LAB
ANION GAP SERPL CALC-SCNC: 5 MMOL/L — SIGNIFICANT CHANGE UP (ref 5–17)
BASOPHILS # BLD AUTO: 0.05 K/UL — SIGNIFICANT CHANGE UP (ref 0–0.2)
BASOPHILS NFR BLD AUTO: 0.7 % — SIGNIFICANT CHANGE UP (ref 0–2)
BUN SERPL-MCNC: 15 MG/DL — SIGNIFICANT CHANGE UP (ref 7–23)
CALCIUM SERPL-MCNC: 9.9 MG/DL — SIGNIFICANT CHANGE UP (ref 8.4–10.5)
CHLORIDE SERPL-SCNC: 103 MMOL/L — SIGNIFICANT CHANGE UP (ref 96–108)
CO2 SERPL-SCNC: 31 MMOL/L — SIGNIFICANT CHANGE UP (ref 22–31)
CREAT SERPL-MCNC: 0.75 MG/DL — SIGNIFICANT CHANGE UP (ref 0.5–1.3)
EGFR: 97 ML/MIN/1.73M2 — SIGNIFICANT CHANGE UP
EOSINOPHIL # BLD AUTO: 0.13 K/UL — SIGNIFICANT CHANGE UP (ref 0–0.5)
EOSINOPHIL NFR BLD AUTO: 1.9 % — SIGNIFICANT CHANGE UP (ref 0–6)
GLUCOSE SERPL-MCNC: 91 MG/DL — SIGNIFICANT CHANGE UP (ref 70–99)
HCG SERPL-ACNC: 1 MIU/ML — SIGNIFICANT CHANGE UP
HCT VFR BLD CALC: 39.8 % — SIGNIFICANT CHANGE UP (ref 34.5–45)
HGB BLD-MCNC: 13.3 G/DL — SIGNIFICANT CHANGE UP (ref 11.5–15.5)
IMM GRANULOCYTES NFR BLD AUTO: 0.1 % — SIGNIFICANT CHANGE UP (ref 0–0.9)
LYMPHOCYTES # BLD AUTO: 2.57 K/UL — SIGNIFICANT CHANGE UP (ref 1–3.3)
LYMPHOCYTES # BLD AUTO: 37.1 % — SIGNIFICANT CHANGE UP (ref 13–44)
MCHC RBC-ENTMCNC: 29.8 PG — SIGNIFICANT CHANGE UP (ref 27–34)
MCHC RBC-ENTMCNC: 33.4 GM/DL — SIGNIFICANT CHANGE UP (ref 32–36)
MCV RBC AUTO: 89 FL — SIGNIFICANT CHANGE UP (ref 80–100)
MONOCYTES # BLD AUTO: 0.54 K/UL — SIGNIFICANT CHANGE UP (ref 0–0.9)
MONOCYTES NFR BLD AUTO: 7.8 % — SIGNIFICANT CHANGE UP (ref 2–14)
NEUTROPHILS # BLD AUTO: 3.63 K/UL — SIGNIFICANT CHANGE UP (ref 1.8–7.4)
NEUTROPHILS NFR BLD AUTO: 52.4 % — SIGNIFICANT CHANGE UP (ref 43–77)
NRBC # BLD: 0 /100 WBCS — SIGNIFICANT CHANGE UP (ref 0–0)
PLATELET # BLD AUTO: 261 K/UL — SIGNIFICANT CHANGE UP (ref 150–400)
POTASSIUM SERPL-MCNC: 4.6 MMOL/L — SIGNIFICANT CHANGE UP (ref 3.5–5.3)
POTASSIUM SERPL-SCNC: 4.6 MMOL/L — SIGNIFICANT CHANGE UP (ref 3.5–5.3)
RBC # BLD: 4.47 M/UL — SIGNIFICANT CHANGE UP (ref 3.8–5.2)
RBC # FLD: 12.1 % — SIGNIFICANT CHANGE UP (ref 10.3–14.5)
SODIUM SERPL-SCNC: 139 MMOL/L — SIGNIFICANT CHANGE UP (ref 135–145)
WBC # BLD: 6.93 K/UL — SIGNIFICANT CHANGE UP (ref 3.8–10.5)
WBC # FLD AUTO: 6.93 K/UL — SIGNIFICANT CHANGE UP (ref 3.8–10.5)

## 2024-04-11 PROCEDURE — 70496 CT ANGIOGRAPHY HEAD: CPT | Mod: MC

## 2024-04-11 PROCEDURE — 70450 CT HEAD/BRAIN W/O DYE: CPT | Mod: 26,MC

## 2024-04-11 PROCEDURE — 36415 COLL VENOUS BLD VENIPUNCTURE: CPT

## 2024-04-11 PROCEDURE — 85025 COMPLETE CBC W/AUTO DIFF WBC: CPT

## 2024-04-11 PROCEDURE — 70496 CT ANGIOGRAPHY HEAD: CPT | Mod: 26,MC

## 2024-04-11 PROCEDURE — 70450 CT HEAD/BRAIN W/O DYE: CPT | Mod: MC

## 2024-04-11 PROCEDURE — 70498 CT ANGIOGRAPHY NECK: CPT | Mod: MC

## 2024-04-11 PROCEDURE — 99284 EMERGENCY DEPT VISIT MOD MDM: CPT | Mod: 25

## 2024-04-11 PROCEDURE — 96361 HYDRATE IV INFUSION ADD-ON: CPT

## 2024-04-11 PROCEDURE — 96365 THER/PROPH/DIAG IV INF INIT: CPT | Mod: XU

## 2024-04-11 PROCEDURE — 84702 CHORIONIC GONADOTROPIN TEST: CPT

## 2024-04-11 PROCEDURE — 70498 CT ANGIOGRAPHY NECK: CPT | Mod: 26,MC

## 2024-04-11 PROCEDURE — 99285 EMERGENCY DEPT VISIT HI MDM: CPT

## 2024-04-11 PROCEDURE — 80048 BASIC METABOLIC PNL TOTAL CA: CPT

## 2024-04-11 RX ORDER — ACETAMINOPHEN 500 MG
1000 TABLET ORAL ONCE
Refills: 0 | Status: COMPLETED | OUTPATIENT
Start: 2024-04-11 | End: 2024-04-11

## 2024-04-11 RX ORDER — MECLIZINE HCL 12.5 MG
25 TABLET ORAL ONCE
Refills: 0 | Status: COMPLETED | OUTPATIENT
Start: 2024-04-11 | End: 2024-04-11

## 2024-04-11 RX ORDER — MECLIZINE HCL 12.5 MG
1 TABLET ORAL
Qty: 42 | Refills: 0
Start: 2024-04-11 | End: 2024-04-24

## 2024-04-11 RX ORDER — SODIUM CHLORIDE 9 MG/ML
1000 INJECTION INTRAMUSCULAR; INTRAVENOUS; SUBCUTANEOUS ONCE
Refills: 0 | Status: COMPLETED | OUTPATIENT
Start: 2024-04-11 | End: 2024-04-11

## 2024-04-11 RX ADMIN — Medication 25 MILLIGRAM(S): at 21:07

## 2024-04-11 RX ADMIN — SODIUM CHLORIDE 1000 MILLILITER(S): 9 INJECTION INTRAMUSCULAR; INTRAVENOUS; SUBCUTANEOUS at 16:54

## 2024-04-11 RX ADMIN — SODIUM CHLORIDE 1000 MILLILITER(S): 9 INJECTION INTRAMUSCULAR; INTRAVENOUS; SUBCUTANEOUS at 17:54

## 2024-04-11 RX ADMIN — Medication 1000 MILLIGRAM(S): at 17:25

## 2024-04-11 RX ADMIN — Medication 400 MILLIGRAM(S): at 16:55

## 2024-04-11 NOTE — ED PROVIDER NOTE - OBJECTIVE STATEMENT
49 F pmh vertigo, disc herniation, ptsd, anxiety, occipital neuralgia, h/o PSA p/w dizziness x 10 days.  pt reports dizziness for 10 days described as feeling off balance and feels like she is leaning to L side when she walks.  reprots 3 days preceding dizziness she had NVD, fever and chills which have since resolved.  also reports dull gradual onset R frontal HA radiating to R shoulder; feels msk tightness in R shoulder.  no chest pain or sob.  has been taking tyelnol and meclizine w/ minimal relief.  pt had similar sxs in past 2021- saw ENT and neuro; was dx w/ occipital neuralgia and given nerve blocks w/ improvement.  reports this feels worse.  denies f/c, neck stiffness, dysarthria, numbness/weakness, paresthesias, palpitations, abd pain, nvd, back pain, fall/trauma

## 2024-04-11 NOTE — ED PROVIDER NOTE - PROGRESS NOTE DETAILS
Juve Paul MD: Patient signed out to me by Dr. Keller pending CT imaging and formal stroke recs. CT including CTA h/n negative. Patient seen by stroke--can be dc'd with symptomatic treatment for peripheral vertigo. Patient given meclizine 25mg dose with improvement, ambulatory. DC with meclizine rx to pharmacy and f/u with neuro outpatient.

## 2024-04-11 NOTE — ED PROVIDER NOTE - NSFOLLOWUPINSTRUCTIONS_ED_ALL_ED_FT
You were seen in the Emergency Department for: dizziness    You were prescribed to the pharmacy: meclizine  Please follow the instructions on the container/label and ask your pharmacist for any questions/concerns.    Please follow up with your primary physician and neurologist Dr. Metzger. If you do not have a primary physician or specialist of your needs, please call [phone] to find one convenient for you. At this number you will be able to locate a provider who accepts your insurance, as well as locate the right specialist for your needs.    You should return to the Emergency Department if you feel any new/worsening/persistent symptoms including but not limited to: fever, chills, vomiting, chest pain, difficulty breathing, loss of consciousness, bleeding, uncontrolled pain, numbness/weakness of a body part

## 2024-04-11 NOTE — ED ADULT NURSE NOTE - OBJECTIVE STATEMENT
Pt is a 50y/o F presenting to the ED w/ c/o of dizziness/lightheadedness, bilat neck pain, ("my vertigo episodes, last episode 3am this morn took Meclizine/Tylenol") xmultiple days, following episode of vomiting/diarrhea/fever/chills/weakness on Easter. 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 Pt currently denies CP, SOB, fever/chills, abd pain, N/V/D, HA, blurry vision, recent falls @ home. Pt A/Ox3, speaking in clear/complete sentences. Respirations easy/even and unlabored on RA. Pt ambulates independently w/ steady gait.  Pt placed in gown, IV placed. Resting comfortably in stretcher. Very anxious upon assessment, requesting NO USE OF OPIOIDS.

## 2024-04-11 NOTE — ED PROVIDER NOTE - ATTENDING APP SHARED VISIT CONTRIBUTION OF CARE
Pt is a 48yo f, h/o vertigo, occipital neuralgia, disk herniation, substance abuse, who p/w 10d of dizziness. + n/v/d/f/c preceding sx's, now resolved. Dizziness feels as if off balance and veering left when walking. + right frontal ha which is typical of her migraines, w/ r sided neck pain. Sx's feel similar to when dx'd w/ occipital neuralgia. Taking tylenol/ meclizine w/ mild relief. No n/t/w. Afebrile. HDS. PE as above. Pt is well appearing. + ttp to r scm/trapezius. + rhomberg. + veers to left at start of ambulation which corrects with continued walking. Plan for labs, ct head, cta h/n to r/o central cause of vertigo including stroke. Will obtain neuro stroke consult.

## 2024-04-11 NOTE — ED PROVIDER NOTE - PHYSICAL EXAMINATION
Vitals reviewed  Gen: comfortable appearing, speaking in full sentences, clear speech, follows instructions w/o issue  Skin: wwp, no rash/lesions  HEENT: ncat, eomi, no nystagmus, perrla, mmm  CV: rrr, no audible m/r/g  Resp: symmetrical expansion, ctab, no w/r/r  Abd: nondistended/soft/nt  Ext: FROM throughout, no peripheral edema, 5/5 strength all ext, SILT equal throughout, distal pulses 2+, no pronator drift   Neuro: alert/oriented x3, no focal deficits, normal finger to nose/João, veers to left w/ walking, + rhomberg-falls toward left

## 2024-04-11 NOTE — ED ADULT TRIAGE NOTE - CHIEF COMPLAINT QUOTE
Pt co intermittent dizziness x 10 days, sx preceded by  3 days of vomiting and diarrhea. Hx of vertigo, herniated discs.

## 2024-04-11 NOTE — ED ADULT NURSE REASSESSMENT NOTE - NS ED NURSE REASSESS COMMENT FT1
Pt states dizziness has "slightly improved" following admin ordered fluids, IV tylenol. Pt to go to CT scan. Pt updated on plan of care, understanding verbalized. Pt resting comfortably in stretcher.

## 2024-04-11 NOTE — ED PROVIDER NOTE - PATIENT PORTAL LINK FT
You can access the FollowMyHealth Patient Portal offered by [facility] by registering at the following website: [email or web address]. By joining [employer]’s FollowMyHealth portal, you will also be able to view your health information using other applications (apps) compatible with our system.

## 2024-04-11 NOTE — CONSULT NOTE ADULT - SUBJECTIVE AND OBJECTIVE BOX
**STROKE CODE CONSULT NOTE**    HPI: 48 y/o Female with PMHx of occipital neuralgia, migraines, and vertigo presents to the ED with Right-sided HA and dizziness x 10 days, described as the sensation of feeling "unsteady" and at "disequilibrium" with the room spinning at first and resolves after a couple of minutes. She states that when getting up to walk, she feels the room spinning all at once but then resolves afterwards but still feels "off." She says that Meclizine and Tylenol provide some relief of symptoms but then comes back once they wear off. Pt reports that before this all started she had a stomach virus x 3 days and then the dizziness occurred. She states that this happened to her before 3 years ago (2021) but what is different from this episode is that it is happening for a longer period of time. After the first dizziness event occurred 3 years ago, they diagnosed her with peripheral vertigo and has since been following with a Neurologist and Vestibular therapy. While following the neurologist, they diagnosed her with occipital neuralgia and steroid injections were administered and has since resolved her symptoms.       T(C): 37.3 (04-11-24 @ 14:57), Max: 37.3 (04-11-24 @ 14:57)  HR: 56 (04-11-24 @ 17:54) (56 - 60)  BP: 131/86 (04-11-24 @ 17:54) (128/80 - 131/86)  RR: 17 (04-11-24 @ 17:54) (17 - 17)  SpO2: 99% (04-11-24 @ 17:54) (98% - 99%)    PAST MEDICAL & SURGICAL HISTORY:  Vertigo            ROS:   Constitutional: No fever, weight loss or fatigue  Eyes: No eye pain, visual disturbances, or discharge  ENMT:  No difficulty hearing, tinnitus; No sinus or throat pain  Neck: (+) Right-sided pain   Respiratory: No cough, wheezing, chills or hemoptysis  Cardiovascular: No chest pain, palpitations, shortness of breath, or leg swelling  Gastrointestinal: No abdominal pain. No nausea, vomiting or hematemesis; No diarrhea or constipation. Nohematochezia.  Genitourinary: No dysuria, frequency, hematuria or incontinence  Neurological: As per HPI  Skin: No itching, burning, rashes or lesions   Endocrine: No heat or cold intolerance; No hair loss  Musculoskeletal: (+) Right shoulder pain     MEDICATIONS  (STANDING):    MEDICATIONS  (PRN):    Allergies    No Known Allergies    Intolerances      Vital Signs Last 24 Hrs  T(C): 37.3 (11 Apr 2024 14:57), Max: 37.3 (11 Apr 2024 14:57)  T(F): 99.1 (11 Apr 2024 14:57), Max: 99.1 (11 Apr 2024 14:57)  HR: 56 (11 Apr 2024 17:54) (56 - 60)  BP: 131/86 (11 Apr 2024 17:54) (128/80 - 131/86)  BP(mean): --  RR: 17 (11 Apr 2024 17:54) (17 - 17)  SpO2: 99% (11 Apr 2024 17:54) (98% - 99%)    Parameters below as of 11 Apr 2024 17:54  Patient On (Oxygen Delivery Method): room air        Physical exam:  Constitutional: No acute distress, conversant  Eyes: moist conjunctivae, see below for CNs  Neck: (+) Right-sided pain, described as spasms.   Cardiovascular: Regular rate and rhythm  Pulmonary:  No use of accessory muscles  Extremities: no edema    Neurologic:  -Mental status: Awake, alert, oriented to person, place, and time. Speech is fluent with intact naming, repetition, and comprehension, no dysarthria. Recent and remote memory intact. Follows commands. Attention/concentration intact. Fund of knowledge appropriate.  When pressing on Right-occipital bone, pt reports pain across entire Right-side of face, mostly on the jaw.   -Cranial nerves:   II: Visual fields are full to confrontation.  III, IV, VI: Extraocular movements are intact without nystagmus. Pupils equally round and reactive to light  V:  Facial sensation V1-V3 equal and intact   VII: Face is symmetric with normal eye closure and smile  Motor: Normal bulk and tone. No pronator drift. Strength bilateral upper extremity 5/5, bilateral lower extremities 5/5.  Sensation: Intact to light touch bilaterally. No neglect or extinction on double simultaneous testing.  Coordination: No dysmetria on finger-to-nose bilaterally.   Gait: Narrow gait. First few steps leans towards the left but then resolves as she continues to walk.     NIHSS: 0.    Fingerstick Blood Glucose: CAPILLARY BLOOD GLUCOSE        LABS:                        13.3   6.93  )-----------( 261      ( 11 Apr 2024 15:20 )             39.8     04-11    139  |  103  |  15  ----------------------------<  91  4.6   |  31  |  0.75    Ca    9.9      11 Apr 2024 15:20            Urinalysis Basic - ( 11 Apr 2024 15:20 )    Color: x / Appearance: x / SG: x / pH: x  Gluc: 91 mg/dL / Ketone: x  / Bili: x / Urobili: x   Blood: x / Protein: x / Nitrite: x   Leuk Esterase: x / RBC: x / WBC x   Sq Epi: x / Non Sq Epi: x / Bacteria: x        RADIOLOGY & ADDITIONAL STUDIES:  CTH: No acute infarction  CTA: pending.

## 2024-04-11 NOTE — ED ADULT NURSE NOTE - NSFALLRISKINTERV_ED_ALL_ED

## 2024-04-11 NOTE — ED PROVIDER NOTE - CLINICAL SUMMARY MEDICAL DECISION MAKING FREE TEXT BOX
49 F pmh vertigo, disc herniation, ptsd, anxiety, occipital neuralgia, h/o PSA p/w dizziness x 10 days.  described as off balance, feels like she is falling to left side.  also w/  dull R frontal HA and R neck discomfort which felt similar to occipital neuralgia in past.  taking meclizine and tylenol w/ minimal relief.  on exam vss, comfortbale appearing, hrrr, lungs ctab, eomi, 5/5 strength all ext, SILT, veers to left w/ walking, + rhomberg-falls toward left.  ?recurrence occipital neuralgia vs migraine vs r/o cva.  will obtain labs, ct head, cta head/neck, c/s stroke and give ivf/tyelnol and reeval

## 2024-04-11 NOTE — ED PROVIDER NOTE - CARE PROVIDER_API CALL
Bon Metzger  Neurology  [address] [address]  Phone: [phone]  Fax: [phone]  Follow Up Time: 7-10 Days

## 2024-04-11 NOTE — ED ADULT NURSE NOTE - NSFALLCONCLUSION_ED_ALL_ED
-- DO NOT REPLY / DO NOT REPLY ALL --  -- Message is from the [facility]--2nd message    COVID-19 Universal Screening: N/A - Not about scheduling    General Patient Message      Reason for Call: patient is calling back regarding status on order for prescription prolia needed for 8.10.21 appointment as pharmacy has not received, please call for confirmation when sent or if any questions    Caller Information       Type Contact Phone    07/26/2021 01:19 PM CDT Phone (Incoming) Evangelina White (Self) [phone] (H)          Alternative phone number: na    Turnaround time given to caller:   \"This message will be sent to [state Provider's name]. The clinical team will fulfill your request as soon as they review your message.\"     Fall Risk

## 2024-04-11 NOTE — CONSULT NOTE ADULT - ASSESSMENT
50 y/o Female with PMHx of occipital neuralgia, migraines, and vertigo presents to the ED with Right-sided HA and dizziness x 10 days, described as the sensation of feeling "unsteady" and at "disequilibrium" with the room spinning at first and resolves after a couple of minutes. NIHSS 0. CTH no acute infarction. CTA pending. Likely ?occipital neuralgia and peripheral vertigo triggered by her recent GI infection    CT head showed: No acute infarction.   CTA/CTP: pending   Initial NIHSS: 0    -treat symptomaticallt   -likey can d/c if CT scans are negative    Case discussed with Dr. Casiano.  50 y/o Female with PMHx of occipital neuralgia, migraines, and vertigo presents to the ED with Right-sided HA and dizziness x 10 days, described as the sensation of feeling "unsteady" and at "disequilibrium" with the room spinning at first and resolves after a couple of minutes. NIHSS 0. CTH no acute infarction. CTA pending. Likely ?occipital neuralgia and peripheral vertigo triggered by her recent GI infection    CT head showed: No acute infarction.   CTA/CTP: pending   Initial NIHSS: 0    -treat symptomatically   -likey can d/c if CT scans are negative    Case discussed with Dr. Casiano.

## 2024-04-12 ENCOUNTER — NON-APPOINTMENT (OUTPATIENT)
Age: 49
End: 2024-04-12

## 2024-04-15 ENCOUNTER — EMERGENCY (EMERGENCY)
Facility: HOSPITAL | Age: 49
LOS: 1 days | Discharge: HOME | End: 2024-04-15
Attending: STUDENT IN AN ORGANIZED HEALTH CARE EDUCATION/TRAINING PROGRAM | Admitting: STUDENT IN AN ORGANIZED HEALTH CARE EDUCATION/TRAINING PROGRAM
Payer: COMMERCIAL

## 2024-04-15 VITALS
HEART RATE: 76 BPM | WEIGHT: 160.06 LBS | SYSTOLIC BLOOD PRESSURE: 153 MMHG | TEMPERATURE: 99 F | HEIGHT: 67 IN | RESPIRATION RATE: 18 BRPM | DIASTOLIC BLOOD PRESSURE: 91 MMHG | OXYGEN SATURATION: 94 %

## 2024-04-15 VITALS
DIASTOLIC BLOOD PRESSURE: 80 MMHG | HEART RATE: 55 BPM | SYSTOLIC BLOOD PRESSURE: 124 MMHG | RESPIRATION RATE: 18 BRPM | TEMPERATURE: 99 F | OXYGEN SATURATION: 99 %

## 2024-04-15 DIAGNOSIS — M25.511 PAIN IN RIGHT SHOULDER: ICD-10-CM

## 2024-04-15 DIAGNOSIS — Z86.69 PERSONAL HISTORY OF OTHER DISEASES OF THE NERVOUS SYSTEM AND SENSE ORGANS: ICD-10-CM

## 2024-04-15 DIAGNOSIS — Z87.891 PERSONAL HISTORY OF NICOTINE DEPENDENCE: ICD-10-CM

## 2024-04-15 DIAGNOSIS — M54.2 CERVICALGIA: ICD-10-CM

## 2024-04-15 DIAGNOSIS — R42 DIZZINESS AND GIDDINESS: ICD-10-CM

## 2024-04-15 DIAGNOSIS — R51.9 HEADACHE, UNSPECIFIED: ICD-10-CM

## 2024-04-15 LAB
ANION GAP SERPL CALC-SCNC: 9 MMOL/L — SIGNIFICANT CHANGE UP (ref 5–17)
BASOPHILS # BLD AUTO: 0.04 K/UL — SIGNIFICANT CHANGE UP (ref 0–0.2)
BASOPHILS NFR BLD AUTO: 0.8 % — SIGNIFICANT CHANGE UP (ref 0–2)
BUN SERPL-MCNC: 16 MG/DL — SIGNIFICANT CHANGE UP (ref 7–23)
CALCIUM SERPL-MCNC: 9.7 MG/DL — SIGNIFICANT CHANGE UP (ref 8.4–10.5)
CHLORIDE SERPL-SCNC: 104 MMOL/L — SIGNIFICANT CHANGE UP (ref 96–108)
CO2 SERPL-SCNC: 26 MMOL/L — SIGNIFICANT CHANGE UP (ref 22–31)
CREAT SERPL-MCNC: 0.68 MG/DL — SIGNIFICANT CHANGE UP (ref 0.5–1.3)
EGFR: 106 ML/MIN/1.73M2 — SIGNIFICANT CHANGE UP
EOSINOPHIL # BLD AUTO: 0.16 K/UL — SIGNIFICANT CHANGE UP (ref 0–0.5)
EOSINOPHIL NFR BLD AUTO: 3.1 % — SIGNIFICANT CHANGE UP (ref 0–6)
GLUCOSE SERPL-MCNC: 90 MG/DL — SIGNIFICANT CHANGE UP (ref 70–99)
HCT VFR BLD CALC: 40.5 % — SIGNIFICANT CHANGE UP (ref 34.5–45)
HGB BLD-MCNC: 13.2 G/DL — SIGNIFICANT CHANGE UP (ref 11.5–15.5)
IMM GRANULOCYTES NFR BLD AUTO: 0 % — SIGNIFICANT CHANGE UP (ref 0–0.9)
LYMPHOCYTES # BLD AUTO: 2.12 K/UL — SIGNIFICANT CHANGE UP (ref 1–3.3)
LYMPHOCYTES # BLD AUTO: 40.5 % — SIGNIFICANT CHANGE UP (ref 13–44)
MCHC RBC-ENTMCNC: 29.5 PG — SIGNIFICANT CHANGE UP (ref 27–34)
MCHC RBC-ENTMCNC: 32.6 GM/DL — SIGNIFICANT CHANGE UP (ref 32–36)
MCV RBC AUTO: 90.6 FL — SIGNIFICANT CHANGE UP (ref 80–100)
MONOCYTES # BLD AUTO: 0.41 K/UL — SIGNIFICANT CHANGE UP (ref 0–0.9)
MONOCYTES NFR BLD AUTO: 7.8 % — SIGNIFICANT CHANGE UP (ref 2–14)
NEUTROPHILS # BLD AUTO: 2.51 K/UL — SIGNIFICANT CHANGE UP (ref 1.8–7.4)
NEUTROPHILS NFR BLD AUTO: 47.8 % — SIGNIFICANT CHANGE UP (ref 43–77)
NRBC # BLD: 0 /100 WBCS — SIGNIFICANT CHANGE UP (ref 0–0)
PLATELET # BLD AUTO: 263 K/UL — SIGNIFICANT CHANGE UP (ref 150–400)
POTASSIUM SERPL-MCNC: 4 MMOL/L — SIGNIFICANT CHANGE UP (ref 3.5–5.3)
POTASSIUM SERPL-SCNC: 4 MMOL/L — SIGNIFICANT CHANGE UP (ref 3.5–5.3)
RBC # BLD: 4.47 M/UL — SIGNIFICANT CHANGE UP (ref 3.8–5.2)
RBC # FLD: 11.9 % — SIGNIFICANT CHANGE UP (ref 10.3–14.5)
SODIUM SERPL-SCNC: 139 MMOL/L — SIGNIFICANT CHANGE UP (ref 135–145)
WBC # BLD: 5.24 K/UL — SIGNIFICANT CHANGE UP (ref 3.8–10.5)
WBC # FLD AUTO: 5.24 K/UL — SIGNIFICANT CHANGE UP (ref 3.8–10.5)

## 2024-04-15 PROCEDURE — 99284 EMERGENCY DEPT VISIT MOD MDM: CPT | Mod: 25

## 2024-04-15 PROCEDURE — 36415 COLL VENOUS BLD VENIPUNCTURE: CPT

## 2024-04-15 PROCEDURE — 80048 BASIC METABOLIC PNL TOTAL CA: CPT

## 2024-04-15 PROCEDURE — 85025 COMPLETE CBC W/AUTO DIFF WBC: CPT

## 2024-04-15 PROCEDURE — 96374 THER/PROPH/DIAG INJ IV PUSH: CPT

## 2024-04-15 RX ORDER — CYCLOBENZAPRINE HYDROCHLORIDE 10 MG/1
1 TABLET, FILM COATED ORAL
Qty: 20 | Refills: 0
Start: 2024-04-15 | End: 2024-04-24

## 2024-04-15 RX ORDER — SODIUM CHLORIDE 9 MG/ML
1000 INJECTION INTRAMUSCULAR; INTRAVENOUS; SUBCUTANEOUS ONCE
Refills: 0 | Status: COMPLETED | OUTPATIENT
Start: 2024-04-15 | End: 2024-04-15

## 2024-04-15 RX ORDER — LIDOCAINE 4 G/100G
1 CREAM TOPICAL ONCE
Refills: 0 | Status: COMPLETED | OUTPATIENT
Start: 2024-04-15 | End: 2024-04-15

## 2024-04-15 RX ORDER — CYCLOBENZAPRINE HYDROCHLORIDE 10 MG/1
10 TABLET, FILM COATED ORAL ONCE
Refills: 0 | Status: COMPLETED | OUTPATIENT
Start: 2024-04-15 | End: 2024-04-15

## 2024-04-15 RX ORDER — KETOROLAC TROMETHAMINE 30 MG/ML
15 SYRINGE (ML) INJECTION ONCE
Refills: 0 | Status: DISCONTINUED | OUTPATIENT
Start: 2024-04-15 | End: 2024-04-15

## 2024-04-15 RX ADMIN — LIDOCAINE 1 PATCH: 4 CREAM TOPICAL at 08:32

## 2024-04-15 RX ADMIN — CYCLOBENZAPRINE HYDROCHLORIDE 10 MILLIGRAM(S): 10 TABLET, FILM COATED ORAL at 08:32

## 2024-04-15 RX ADMIN — Medication 15 MILLIGRAM(S): at 08:31

## 2024-04-15 RX ADMIN — SODIUM CHLORIDE 1000 MILLILITER(S): 9 INJECTION INTRAMUSCULAR; INTRAVENOUS; SUBCUTANEOUS at 08:31

## 2024-04-15 NOTE — ED PROVIDER NOTE - NS ED ROS FT
Constitutional: No fever or chills  Eyes: No discharge or drainage  Ears, Nose, Mouth, Throat: No nasal discharge, no sore throat  Cardiovascular: No chest pain, no palpitations  Respiratory: No shortness of breath, no cough  Gastrointestinal: No nausea or vomiting, no abdominal pain, no diarrhea or constipation  Musculoskeletal: No joint pain, no swelling  Skin: No rashes or lesions  Neurological: No numbness, weakness, tingling, no headache, neck pain

## 2024-04-15 NOTE — ED PROVIDER NOTE - PATIENT PORTAL LINK FT
You can access the FollowMyHealth Patient Portal offered by [facility] by registering at the following website: [email or web address]. By joining [employer]’s FollowMyHealth portal, you will also be able to view your health information using other applications (apps) compatible with our system.

## 2024-04-15 NOTE — ED ADULT NURSE NOTE - OBJECTIVE STATEMENT
Pt aaox3 c/o worsening neck pain, worse with turning the head. Pt taking meclizine with relief of dizziness, pt still c/o pain. pt denies any vision changes N/V/D, SOB, or CP.

## 2024-04-15 NOTE — ED PROVIDER NOTE - NSFOLLOWUPINSTRUCTIONS_ED_ALL_ED_FT
You were seen in the Emergency Department for neck pain. You received a muscle relaxant in the ER. Please take tylenol or motrin as needed for pain. Take meclizine as needed for dizziness. You can take flexeril for your muscle spasm as needed. Do not take this prior to driving as it may make you drowsy. Return for worsening symptoms, worsening pain, numbness, weakness, tingling. Otherwise, please follow up closely with your primary physician and neurologist.     I hope you feel better soon!    Sincerely,  Gentry Horn MD

## 2024-04-15 NOTE — ED ADULT TRIAGE NOTE - ARRIVAL INFO ADDITIONAL COMMENTS
pt seen here 3 days ago for headache and vertigo (was code stroke) and was treated for vertigo and migraine.    pt returns now c/o "neck seizing up" intermittently and this am is unable to relax it.

## 2024-04-15 NOTE — ED PROVIDER NOTE - NSDCPRINTRESULTS_ED_ALL_ED
Patient requests all Lab, Cardiology, and Radiology Results on their Discharge Instructions rolling walker

## 2024-04-15 NOTE — ED ADULT NURSE NOTE - NS ED NOTE ABUSE RESPONSE YN
Yanna Jimenez MD  01/16/22 1519 Yes

## 2024-04-15 NOTE — ED PROVIDER NOTE - PHYSICAL EXAMINATION
General: Well appearing, in no acute distress  HEENT: Normocephalic, atraumatic, extraocular movements intact, no bruits  CV: Regular rate  Pulm: No respiratory distress, no tachypnea  Abd: Flat, no gross distension  Ext: warm and well perfused, 2+ pulses  Skin: No gross rashes or lesions  MSK: No midline ctl spine ttp  Neuro: Alert and oriented, moving all extremities, CN II-XII intact, motor intact bilaterally, sensation intact bilaterally

## 2024-04-15 NOTE — ED PROVIDER NOTE - CLINICAL SUMMARY MEDICAL DECISION MAKING FREE TEXT BOX
49 year old female presenting with neck stiffness, spasm, neuro intact. Suspect muscular spasm, already recent neg CTA neck, no bruit, symmetric pulses. will treat symptoms, reassess.

## 2024-04-17 DIAGNOSIS — F43.10 POST-TRAUMATIC STRESS DISORDER, UNSPECIFIED: ICD-10-CM

## 2024-04-17 DIAGNOSIS — R42 DIZZINESS AND GIDDINESS: ICD-10-CM

## 2024-04-17 DIAGNOSIS — F41.9 ANXIETY DISORDER, UNSPECIFIED: ICD-10-CM

## 2024-04-17 DIAGNOSIS — M54.81 OCCIPITAL NEURALGIA: ICD-10-CM

## 2024-04-17 DIAGNOSIS — M54.2 CERVICALGIA: ICD-10-CM

## 2024-04-24 ENCOUNTER — APPOINTMENT (OUTPATIENT)
Dept: NEUROLOGY | Facility: CLINIC | Age: 49
End: 2024-04-24
Payer: MEDICAID

## 2024-04-24 VITALS
DIASTOLIC BLOOD PRESSURE: 80 MMHG | TEMPERATURE: 98.4 F | WEIGHT: 181 LBS | OXYGEN SATURATION: 98 % | SYSTOLIC BLOOD PRESSURE: 126 MMHG | HEIGHT: 67 IN | BODY MASS INDEX: 28.41 KG/M2 | HEART RATE: 75 BPM

## 2024-04-24 DIAGNOSIS — G43.009 MIGRAINE W/OUT AURA, NOT INTRACTABLE, W/OUT STATUS MIGRAINOSUS: ICD-10-CM

## 2024-04-24 DIAGNOSIS — Z60.2 PROBLEMS RELATED TO LIVING ALONE: ICD-10-CM

## 2024-04-24 DIAGNOSIS — G24.09: ICD-10-CM

## 2024-04-24 DIAGNOSIS — T50.905A: ICD-10-CM

## 2024-04-24 PROCEDURE — 99204 OFFICE O/P NEW MOD 45 MIN: CPT

## 2024-04-24 RX ORDER — ESCITALOPRAM OXALATE 20 MG/1
20 TABLET, FILM COATED ORAL
Refills: 0 | Status: ACTIVE | COMMUNITY

## 2024-04-24 RX ORDER — METHOCARBAMOL 500 MG/1
500 TABLET, FILM COATED ORAL 3 TIMES DAILY
Qty: 60 | Refills: 1 | Status: ACTIVE | COMMUNITY
Start: 2024-04-24 | End: 1900-01-01

## 2024-04-24 RX ORDER — MECLIZINE HYDROCHLORIDE 25 MG/1
25 TABLET ORAL
Refills: 0 | Status: ACTIVE | COMMUNITY

## 2024-04-24 RX ORDER — DIPHENHYDRAMINE HCL 25 MG/1
25 TABLET ORAL
Qty: 20 | Refills: 0 | Status: ACTIVE | COMMUNITY
Start: 2024-04-24 | End: 1900-01-01

## 2024-04-24 SDOH — SOCIAL STABILITY - SOCIAL INSECURITY: PROBLEMS RELATED TO LIVING ALONE: Z60.2

## 2024-04-24 NOTE — ASSESSMENT
[FreeTextEntry1] : Recurrent severe vertigo.  will proceed with MRI IAC with and without contrast.   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 start benadryl 25 mg at bedtime start robaxin 500 mg bid Consider occipital NB on the right.

## 2024-04-24 NOTE — PHYSICAL EXAM
[FreeTextEntry1] : The patient is alert and oriented x3, naming intact x3, repetition normal, follows three-step commands, and is able to participate fully in the history taking. Speech is normal with no evidence of dysarthria. Memory is intact: Immediate recall 3 out of 3, short-term 3 out of 3, remote memory intact Cranial nerves II through XII intact Motor exam: Upper and lower extremities 5 out of 5 power, normal tone. trapezius spasm on the right with shoulder hiked up.  Sensory exam: Intact to light touch and pinprick. Romberg negative. Coordination and vestibular exam: Finger to nose intact, no evidence of truncal or appendicular ataxia. No evidence of nystagmus. no vestibular symptoms elicited with head turning during ambulation. Gait: Normal stance and gait. Reflexes: One to 2+ in upper and lower extremities. No pathological reflexes. Downgoing toes.

## 2024-04-24 NOTE — HISTORY OF PRESENT ILLNESS
[FreeTextEntry1] : CC: 49 year old patient with recurrent severe vertigo.   Patient has been to the ED x 2 in the last few weeks due to severe vertigo. Also had severe right neck spasm that she couldn't turn her head. Started feeling right face trigeminal shocks and neck shocks.     Patient was first seen by ENT for evaluation of vertigo in 2021. Her first episode occurred 5 days after her second covid vaccine. The episode was very short and was associated with nausea and occurred when she turned over in bed. On the advice of her PCP, she performed the Eppley maneuver and felt better. Early the following week she turned over to the right in bed and once again felt vertiginous and this time it was severe with nausea.  went to the ER and saw ENT and had Eppley maneuver and did not help.  Vestibular therapy helped but she felt foggy and off. ended up receiving occipital nerve blocks for neck pain and fogginess.   PMH of migraines - menstrual migraines that was treated with sumatriptan. never had vestibular migraines.  Was also treated with acupuncture.   h/o sciatica - 7years ago down right side.   former ballerina and yoga.  18 years ago had a cervical migraine    Head CT: ACC: [id number] EXAM: CT BRAIN ORDERED BY: EMILY NJ  PROCEDURE DATE: 04/11/2024    INTERPRETATION: Exam Date: 4/11/2024 6:34 PM  CT head without IV contrast  CLINICAL INFORMATION: dizziness 10 days, off balance  TECHNIQUE: Contiguous axial sections were obtained through the head. Coronal and sagittal reformats were obtained.  COMPARISON: CT head 4/27/2021  FINDINGS:  There is no evidence of intraparenchymal or extraaxial hemorrhage. There is no CT evidence of large vessel acute infarct. No mass effect is found in the brain. No evidence of midline shift or herniation pattern.  The ventricles, sulci and basal cisterns appear unremarkable.  Visualized paranasal sinuses are clear.  IMPRESSION:  No acute intracranial findings.

## 2024-04-25 ENCOUNTER — TRANSCRIPTION ENCOUNTER (OUTPATIENT)
Age: 49
End: 2024-04-25

## 2024-05-08 ENCOUNTER — OUTPATIENT (OUTPATIENT)
Dept: OUTPATIENT SERVICES | Facility: HOSPITAL | Age: 49
LOS: 1 days | End: 2024-05-08
Payer: MEDICAID

## 2024-05-08 ENCOUNTER — APPOINTMENT (OUTPATIENT)
Dept: MRI IMAGING | Facility: HOSPITAL | Age: 49
End: 2024-05-08

## 2024-05-08 PROCEDURE — A9585: CPT

## 2024-05-08 PROCEDURE — 70553 MRI BRAIN STEM W/O & W/DYE: CPT | Mod: 26

## 2024-05-08 PROCEDURE — 70553 MRI BRAIN STEM W/O & W/DYE: CPT

## 2024-05-13 ENCOUNTER — APPOINTMENT (OUTPATIENT)
Dept: NEUROLOGY | Facility: CLINIC | Age: 49
End: 2024-05-13
Payer: MEDICAID

## 2024-05-13 ENCOUNTER — NON-APPOINTMENT (OUTPATIENT)
Age: 49
End: 2024-05-13

## 2024-05-13 VITALS
SYSTOLIC BLOOD PRESSURE: 138 MMHG | HEIGHT: 67 IN | WEIGHT: 181 LBS | HEART RATE: 72 BPM | OXYGEN SATURATION: 98 % | BODY MASS INDEX: 28.41 KG/M2 | TEMPERATURE: 97.9 F | DIASTOLIC BLOOD PRESSURE: 85 MMHG

## 2024-05-13 PROCEDURE — 99214 OFFICE O/P EST MOD 30 MIN: CPT

## 2024-05-20 NOTE — PHYSICAL EXAM
[FreeTextEntry1] : Decreased spasm but still has spasm on the right trapezius.  When she turns her head to the right she has pain on the right side of the neck.

## 2024-05-20 NOTE — HISTORY OF PRESENT ILLNESS
[FreeTextEntry1] : Patients MR brain IAC negative.   Patient had a fall  - 3/7 at a [employer] concert.  She was picked up by a man suddenly and they both fell to the ground.   ACC: [id number]     EXAM:  MR IAC ONLY [facility]   ORDERED BY: JE HOYOS  PROCEDURE DATE:  05/08/2024    INTERPRETATION:  PROCEDURE: MRI of the brain utilizing a IAC protocol with and without intravenous contrast.  INDICATION: Recurrent vertigo.  TECHNIQUE:  Sagittal T1, axial diffusion, FLAIR, T2 weighted images of the brain were obtained. Postcontrast axial, coronal and sagittal views of the brain were included. Axial and coronal T1 and axial T2 SPACE images were obtained through the internal auditory canals. Following the intravenous administration of gadolinium, axial T1 weighted images of the brain and axial and coronal T1-weighted images through the IAC were obtained.  CONTRAST AGENT: 7.5 cc's of Gadavist was administered intravenously. 0.0 cc was discarded  FINDINGS:   No restricted diffusion signal to suspect recent ischemia. No intracranial hemorrhages, midline shift or mass effect are demonstrated. No intracranial enhancing lesions following administration of intravenous contrast. Brainstem is normal in size and signal. Partly empty sella.  The ventricles are normal in size and configuration. The perimesencephalic cisterns are intact. No hydrocephalus is evident.  No nodular lesions mass effect or abnormal enhancement in CP angles and internal auditory canals are demonstrated. The course of 7-8 cranial nerve is unremarkable. Fluid signal in the inner ear structure is maintained. No cochlear anomalies are identified.  No abnormal collection or enhancing lesions in extra-axial spaces.  Flow-voids in the major vascular structures is maintained. Narrowing of the transverse/sigmoid sinuses more prominent on the left. Filling defect in the left transverse sinus likely secondary to arachnoid granulation.  No significant mucosal thickening in paranasal sinuses.. Mastoids are intact. Intraorbital content is unremarkable.  IMPRESSION:  No retrocochlear lesions or abnormal enhancement.  No acute cerebral ischemia, intracranial hemorrhages or enhancing lesions.  Partly empty sella and narrowing of the transverse/sigmoid sinuses, nonspecific in isolation but can be seen with idiopathic intracranial hypertension. Clinical correlation is recommended.  --- End of Report ---

## 2024-05-20 NOTE — ASSESSMENT
[FreeTextEntry1] : Dystonia is improved.  trapezius spasm.  Patient may have a righty cervical radiculopathy

## 2024-05-29 ENCOUNTER — APPOINTMENT (OUTPATIENT)
Dept: MRI IMAGING | Facility: HOSPITAL | Age: 49
End: 2024-05-29

## 2024-05-29 ENCOUNTER — OUTPATIENT (OUTPATIENT)
Dept: OUTPATIENT SERVICES | Facility: HOSPITAL | Age: 49
LOS: 1 days | End: 2024-05-29
Payer: MEDICAID

## 2024-05-29 PROCEDURE — 72141 MRI NECK SPINE W/O DYE: CPT

## 2024-05-29 PROCEDURE — 72141 MRI NECK SPINE W/O DYE: CPT | Mod: 26

## 2024-06-04 ENCOUNTER — APPOINTMENT (OUTPATIENT)
Dept: NEUROLOGY | Facility: CLINIC | Age: 49
End: 2024-06-04
Payer: MEDICAID

## 2024-06-04 VITALS
SYSTOLIC BLOOD PRESSURE: 115 MMHG | HEIGHT: 67 IN | HEART RATE: 89 BPM | BODY MASS INDEX: 28.09 KG/M2 | DIASTOLIC BLOOD PRESSURE: 73 MMHG | TEMPERATURE: 97.7 F | OXYGEN SATURATION: 95 % | WEIGHT: 179 LBS

## 2024-06-04 DIAGNOSIS — M54.12 RADICULOPATHY, CERVICAL REGION: ICD-10-CM

## 2024-06-04 DIAGNOSIS — R42 DIZZINESS AND GIDDINESS: ICD-10-CM

## 2024-06-04 PROCEDURE — 99215 OFFICE O/P EST HI 40 MIN: CPT

## 2024-06-04 NOTE — PHYSICAL EXAM
[FreeTextEntry1] : Alert. Fully oriented. Speech and language are intact. Cranial nerves II-XII are intact. Motor exam reveals intact strength with individual muscle testing in bilateral upper and lower extremities. Gait is normal.

## 2024-06-04 NOTE — HISTORY OF PRESENT ILLNESS
[FreeTextEntry1] : Aleena Moore is a 49 year old female with PMH of migraines, right sided sciatica and recent fall in March 2024 presents for neurological follow up to review MRI C-spine imaging due to right sided radiculopathy.  Since last visit, patient completed a MRI C-spine who degenerative changes at C6/C7 but no evidence of abnormal signal changes within the spinal cord or evidence of foraminal narrowing. She reports 2 weeks free of right sided neck spasms. She has participated in physical therapy three times since last visit. She still feels like she has some component of right sided trigeminal neuralgia but denies any numbness/tingling, more tightness. She reports no further episodes of dizziness. She reports that ice packs also help with her right shoulder tightness and is not currently on any prescription medications such as Meclizine and Baclofen, only Ibuprofen BID PRN. She reports having acupuncture yesterday and is interested to know if her insurance will cover any more sessions. She feels like her symptoms have been exacerbated by extreme stress in her personal life with anxiety and PTSD at baseline. She reports minor falls previously and remote history of vertigo.

## 2024-06-04 NOTE — ASSESSMENT
[FreeTextEntry1] : Aleena Moore is a 49 year old female with PMH of migraines, right sided sciatica and recent fall in March 2024 presents for neurological follow up to review MRI C-spine imaging due to right sided radiculopathy.  Plan: -Continue PT and acupuncture as tolerated -Continue conservative pain management remedies such as ice packs and Ibuprofen PRN -Can consider VT if dizziness reoccurs -RTO in 3 months with Dr. Metzger to check in

## 2024-10-03 ENCOUNTER — APPOINTMENT (OUTPATIENT)
Dept: NEUROLOGY | Facility: CLINIC | Age: 49
End: 2024-10-03

## 2024-10-03 VITALS
DIASTOLIC BLOOD PRESSURE: 84 MMHG | WEIGHT: 189.2 LBS | OXYGEN SATURATION: 96 % | HEART RATE: 67 BPM | TEMPERATURE: 97.7 F | BODY MASS INDEX: 29.63 KG/M2 | SYSTOLIC BLOOD PRESSURE: 132 MMHG

## 2024-10-03 DIAGNOSIS — R51.9 HEADACHE, UNSPECIFIED: ICD-10-CM

## 2024-10-03 DIAGNOSIS — F07.81 POSTCONCUSSIONAL SYNDROME: ICD-10-CM

## 2024-10-03 PROCEDURE — 99213 OFFICE O/P EST LOW 20 MIN: CPT

## 2024-10-03 RX ORDER — ONABOTULINUMTOXINA 200 [USP'U]/1
200 INJECTION, POWDER, LYOPHILIZED, FOR SOLUTION INTRADERMAL; INTRAMUSCULAR
Qty: 1 | Refills: 4 | Status: ACTIVE | COMMUNITY
Start: 2024-10-03 | End: 1900-01-01

## 2024-10-03 NOTE — ASSESSMENT
[FreeTextEntry1] : Aleena Moore is a 49 year old female with PMH of migraines, right sided sciatica and recent fall and concussion in March 2024 presents for neurological follow up.  Due to fall and concussion with post traumatic vertigo and severe neck spasm she and since her job requires strenuous physical activity she has not been able to work for the past 6 months ad still requires further treatment including PT, acupuncture and Botox. Plan: -Continue PT and acupuncture as tolerated -consider Botox for her migraines. She has had nerve blocks in the past and they have not lasted.

## 2024-10-03 NOTE — HISTORY OF PRESENT ILLNESS
[FreeTextEntry1] : Interval course:  Patient still underway for PT and doing acupuncture 2/month.  ROM of neck is better.  went back to do a few jobs but had spasms of her right scm that became very painful and triggered her migraines.  takes advil for the migraines but that has been working less.  stopped caffeine as it was increasing her neurological symptoms increased meditation    HPI: Aleena Moore is a 49 year old female with PMH of migraines, right sided sciatica and recent fall in March 2024 presents for neurological follow up to review MRI C-spine imaging due to right sided radiculopathy.  Since last visit, patient completed a MRI C-spine who degenerative changes at C6/C7 but no evidence of abnormal signal changes within the spinal cord or evidence of foraminal narrowing. She reports 2 weeks free of right sided neck spasms. She has participated in physical therapy three times since last visit. She still feels like she has some component of right sided trigeminal neuralgia but denies any numbness/tingling, more tightness. She reports no further episodes of dizziness. She reports that ice packs also help with her right shoulder tightness and is not currently on any prescription medications such as Meclizine and Baclofen, only Ibuprofen BID PRN. She reports having acupuncture yesterday and is interested to know if her insurance will cover any more sessions. She feels like her symptoms have been exacerbated by extreme stress in her personal life with anxiety and PTSD at baseline. She reports minor falls previously and remote history of vertigo.

## 2024-12-18 ENCOUNTER — TRANSCRIPTION ENCOUNTER (OUTPATIENT)
Age: 49
End: 2024-12-18

## 2024-12-31 ENCOUNTER — TRANSCRIPTION ENCOUNTER (OUTPATIENT)
Age: 49
End: 2024-12-31

## 2025-01-27 ENCOUNTER — TRANSCRIPTION ENCOUNTER (OUTPATIENT)
Age: 50
End: 2025-01-27

## 2025-04-15 ENCOUNTER — APPOINTMENT (OUTPATIENT)
Dept: NEUROLOGY | Facility: CLINIC | Age: 50
End: 2025-04-15